# Patient Record
Sex: FEMALE | Race: WHITE | NOT HISPANIC OR LATINO | URBAN - METROPOLITAN AREA
[De-identification: names, ages, dates, MRNs, and addresses within clinical notes are randomized per-mention and may not be internally consistent; named-entity substitution may affect disease eponyms.]

---

## 2024-05-13 RX ORDER — SODIUM CHLORIDE 9 MG/ML
1000 INJECTION, SOLUTION INTRAVENOUS
Refills: 0 | Status: DISCONTINUED | OUTPATIENT
Start: 2024-05-15 | End: 2024-05-15

## 2024-05-14 NOTE — ASU PATIENT PROFILE, ADULT - NS PREOP UNDERSTANDS INFO
No solid food/dairy/candy/gum after midnight; water allowed before 05:00am; reminded to come with photo ID/insurance card; escort to have a photo ID; dress in comfortable clothes; no jewelries/contact lens; no smoking/drinking alcohol/illicit drug use today; address and callback number was given./yes

## 2024-05-14 NOTE — ASU PATIENT PROFILE, ADULT - FALL HARM RISK - HARM RISK INTERVENTIONS
Communicate Risk of Fall with Harm to all staff/Reinforce activity limits and safety measures with patient and family/Tailored Fall Risk Interventions/Visual Cue: Yellow wristband and red socks/Bed in lowest position, wheels locked, appropriate side rails in place/Call bell, personal items and telephone in reach/Instruct patient to call for assistance before getting out of bed or chair/Non-slip footwear when patient is out of bed/[place] to call system/Physically safe environment - no spills, clutter or unnecessary equipment/Purposeful Proactive Rounding/Room/bathroom lighting operational, light cord in reach Assistance with ambulation/Assistance OOB with selected safe patient handling equipment/Communicate Risk of Fall with Harm to all staff/Discuss with provider need for PT consult/Monitor gait and stability/Provide patient with walking aids - walker, cane, crutches/Reinforce activity limits and safety measures with patient and family/Tailored Fall Risk Interventions/Visual Cue: Yellow wristband and red socks/Bed in lowest position, wheels locked, appropriate side rails in place/Call bell, personal items and telephone in reach/Instruct patient to call for assistance before getting out of bed or chair/Non-slip footwear when patient is out of bed/[place] to call system/Physically safe environment - no spills, clutter or unnecessary equipment/Purposeful Proactive Rounding/Room/bathroom lighting operational, light cord in reach

## 2024-05-14 NOTE — ASU PATIENT PROFILE, ADULT - REASON FOR ADMISSION, PROFILE
Cataract extraction with intraocular lens implant with possible anterior vitrectomy Femtosecond laser Left eye

## 2024-05-14 NOTE — ASU PATIENT PROFILE, ADULT - NSICDXPASTSURGICALHX_GEN_ALL_CORE_FT
PAST SURGICAL HISTORY:  H/O fracture of tibia left    H/O rhinoplasty     H/O total hysterectomy     History of radiofrequency ablation (RFA) of nerve of lumbar spine

## 2024-05-14 NOTE — ASU PATIENT PROFILE, ADULT - NSICDXPASTMEDICALHX_GEN_ALL_CORE_FT
PAST MEDICAL HISTORY:  Hypothyroidism     Osteoporosis     Seasonal allergies      PAST MEDICAL HISTORY:  History of tremor left arm, worse when upset    Hypothyroidism     Osteoporosis     Seasonal allergies

## 2024-05-15 ENCOUNTER — OUTPATIENT (OUTPATIENT)
Dept: OUTPATIENT SERVICES | Facility: HOSPITAL | Age: 79
LOS: 1 days | Discharge: HOME | End: 2024-05-15

## 2024-05-15 VITALS
SYSTOLIC BLOOD PRESSURE: 150 MMHG | WEIGHT: 149.91 LBS | DIASTOLIC BLOOD PRESSURE: 63 MMHG | RESPIRATION RATE: 16 BRPM | OXYGEN SATURATION: 97 % | TEMPERATURE: 98 F | HEIGHT: 63 IN | HEART RATE: 71 BPM

## 2024-05-15 VITALS
TEMPERATURE: 98 F | OXYGEN SATURATION: 96 % | DIASTOLIC BLOOD PRESSURE: 57 MMHG | SYSTOLIC BLOOD PRESSURE: 125 MMHG | RESPIRATION RATE: 16 BRPM | HEART RATE: 76 BPM

## 2024-05-15 DIAGNOSIS — Z98.890 OTHER SPECIFIED POSTPROCEDURAL STATES: Chronic | ICD-10-CM

## 2024-05-15 DIAGNOSIS — Z90.710 ACQUIRED ABSENCE OF BOTH CERVIX AND UTERUS: Chronic | ICD-10-CM

## 2024-05-15 DIAGNOSIS — Z87.81 PERSONAL HISTORY OF (HEALED) TRAUMATIC FRACTURE: Chronic | ICD-10-CM

## 2024-05-15 DEVICE — IMPLANTABLE DEVICE
Type: IMPLANTABLE DEVICE | Status: NON-FUNCTIONAL
Removed: 2024-05-15

## 2024-05-15 RX ORDER — PHENYLEPHRINE HCL 2.5 %
1 DROPS OPHTHALMIC (EYE)
Refills: 0 | Status: COMPLETED | OUTPATIENT
Start: 2024-05-15 | End: 2024-05-15

## 2024-05-15 RX ORDER — KETOROLAC TROMETHAMINE 0.5 %
1 DROPS OPHTHALMIC (EYE)
Refills: 0 | Status: COMPLETED | OUTPATIENT
Start: 2024-05-15 | End: 2024-05-15

## 2024-05-15 RX ORDER — TROPICAMIDE 1 %
1 DROPS OPHTHALMIC (EYE)
Refills: 0 | Status: COMPLETED | OUTPATIENT
Start: 2024-05-15 | End: 2024-05-15

## 2024-05-15 RX ORDER — CYCLOPENTOLATE HYDROCHLORIDE 10 MG/ML
1 SOLUTION/ DROPS OPHTHALMIC
Refills: 0 | Status: COMPLETED | OUTPATIENT
Start: 2024-05-15 | End: 2024-05-15

## 2024-05-15 RX ORDER — OFLOXACIN 0.3 %
1 DROPS OPHTHALMIC (EYE)
Refills: 0 | Status: COMPLETED | OUTPATIENT
Start: 2024-05-15 | End: 2024-05-15

## 2024-05-15 RX ORDER — ACETAMINOPHEN 500 MG
650 TABLET ORAL ONCE
Refills: 0 | Status: DISCONTINUED | OUTPATIENT
Start: 2024-05-15 | End: 2024-05-15

## 2024-05-15 RX ADMIN — Medication 1 DROP(S): at 07:52

## 2024-05-15 RX ADMIN — Medication 1 DROP(S): at 07:57

## 2024-05-15 RX ADMIN — Medication 1 DROP(S): at 07:47

## 2024-05-15 RX ADMIN — Medication 1 DROP(S): at 07:48

## 2024-05-15 RX ADMIN — CYCLOPENTOLATE HYDROCHLORIDE 1 DROP(S): 10 SOLUTION/ DROPS OPHTHALMIC at 07:47

## 2024-05-15 RX ADMIN — CYCLOPENTOLATE HYDROCHLORIDE 1 DROP(S): 10 SOLUTION/ DROPS OPHTHALMIC at 07:57

## 2024-05-15 RX ADMIN — CYCLOPENTOLATE HYDROCHLORIDE 1 DROP(S): 10 SOLUTION/ DROPS OPHTHALMIC at 07:52

## 2024-05-15 NOTE — PACU DISCHARGE NOTE - NAUSEA/VOMITING:
Here today for NBUVB for psoriasis  Treatment # 16  No issues   photoproection on eyes, lips, nipples  1212 mj 2 minute 56 seconds today  Mineral oil applied  Goal 3 times weekly  
None

## 2024-05-15 NOTE — OPERATIVE REPORT - OPERATIVE RPOSRT DETAILS
PROCEDURE DATE:    SURGEON: KRISTAL WALTER MD     ANESTHESIA:  MAC.    PREOPERATIVE DIAGNOSIS(ES):  Cataract, left eye    POSTOPERATIVE DIAGNOSIS(ES):  Cataract, left eye    OPERATION:  Femtosecond laser assisted cataract extraction with intraocular lens insertion, left eye.    COMPLICATIONS:  None.    SPECIMENS:  None.    ESTIMATED BLOOD LOSS: <1 cc    DESCRIPTION OF PROCEDURE:  The patient was identified in the ASU.  The risks, benefits, and alternatives to surgery were discussed with the patient at length including but not limtied to bleeding, infection, inflammation, decreased or loss of vision, loss of the eye, need for further surgery, aphakia, vitrectomy.  Informed consent was obtained.  The left eye was identified and marked.      The patient was then bought to the laser room and time out was taken to confirm the patients identity, site, procedure.  Tetracaine eye drops were instilled.  The left eye was marked at the 3, 6, 9 o' clock positions.  The patient was placed in the supine position on the femto table and the left eye was docked onto the laser.  The femtosecond laser was then used to construct arcuate incisions, a curvilinear continuous capsulrohexis and nucleas fragmentation.  The patient's eye was then undocked from the laser.     The patient was then brought to the operating room and placed in the supine position.  Time out was retaken.  Tetracaine eye drops were reinstilled.  The left eye was prepped and draped in the usual sterile fashion for intraocular surgery.  An eyelid speculum was then placed underneath the right eyelids.    A 1.0mm sideport blade was used to create a paracentesis.  Preservativefree 1% lidocaine was injected into the anterior chamber, followed by preservative free epinephrine for pupil dilation, followed by Viscoat.  A 2.4 keratome was used to create a temporal clear corneal incision.  Utrata forceps were used to remove the capsular button.  Hydrodissection was done with BSS, and the lens was noted to rotate freely in the capsular bag.    Phacoemulsification was accomplished  without complications.  Residual cortical material was removed using singlehandpiece coaxial polymer tipped irrigation and aspiration.   Healon was then injected into the capsular bag.  The CC60WF 24.0 diopter lens was implanted into the capsular bag and rotated into proper position using a Kuglen hook.  Irrigation and aspiration was used to remove any residual cortical material and viscoelastic.  All wounds were hydrated and found to be watertight.  The lens was centered, and the anterior chamber was deep.  IOP was within physiological limits.  No complications were noted.    Topical antibiotics and steroids and NSAID were applied to the surface of the left eye.  The eyelid speculum was removed.  The was shielded.  The patient tolerated the procedure well and was brought to the postoperative care unit in stable condition.

## 2024-05-15 NOTE — PRE-ANESTHESIA EVALUATION ADULT - NSANTHOSAYNRD_GEN_A_CORE
No. TYRELL screening performed.  STOP BANG Legend: 0-2 = LOW Risk; 3-4 = INTERMEDIATE Risk; 5-8 = HIGH Risk

## 2024-06-12 PROBLEM — M81.0 AGE-RELATED OSTEOPOROSIS WITHOUT CURRENT PATHOLOGICAL FRACTURE: Chronic | Status: ACTIVE | Noted: 2024-05-14

## 2024-06-12 PROBLEM — Z86.69 PERSONAL HISTORY OF OTHER DISEASES OF THE NERVOUS SYSTEM AND SENSE ORGANS: Chronic | Status: ACTIVE | Noted: 2024-05-15

## 2024-06-12 PROBLEM — J30.2 OTHER SEASONAL ALLERGIC RHINITIS: Chronic | Status: ACTIVE | Noted: 2024-05-14

## 2024-06-12 PROBLEM — E03.9 HYPOTHYROIDISM, UNSPECIFIED: Chronic | Status: ACTIVE | Noted: 2024-05-14

## 2024-06-18 RX ORDER — SODIUM CHLORIDE 9 MG/ML
1000 INJECTION, SOLUTION INTRAVENOUS
Refills: 0 | Status: DISCONTINUED | OUTPATIENT
Start: 2024-06-19 | End: 2024-06-19

## 2024-06-18 NOTE — ASU PATIENT PROFILE, ADULT - NSICDXPASTMEDICALHX_GEN_ALL_CORE_FT
PAST MEDICAL HISTORY:  History of tremor left arm, worse when upset    Hypothyroidism     Osteoporosis     Seasonal allergies      PAST MEDICAL HISTORY:  Arthritis knees    Easy bruising     H/O sinusitis     History of tremor left arm, worse when upset    Hypothyroidism     Osteoporosis     Seasonal allergies

## 2024-06-18 NOTE — ASU PATIENT PROFILE, ADULT - NS PREOP UNDERSTANDS INFO
Spoke to patient to be NPO/No solid foods after  2200 pm tonight, allow to drink water or apple juice till  12-2 am,  dress comfortable  no lotion s, no jewelry  , bring ID photo  and insurance cards , escort arranged , address and telephone given y/yes

## 2024-06-18 NOTE — ASU PATIENT PROFILE, ADULT - VISION (WITH CORRECTIVE LENSES IF THE PATIENT USUALLY WEARS THEM):
Normal vision: sees adequately in most situations; can see medication labels, newsprint PRN/Partially impaired: cannot see medication labels or newsprint, but can see obstacles in path, and the surrounding layout; can count fingers at arm's length

## 2024-06-19 ENCOUNTER — OUTPATIENT (OUTPATIENT)
Dept: OUTPATIENT SERVICES | Facility: HOSPITAL | Age: 79
LOS: 1 days | Discharge: HOME | End: 2024-06-19

## 2024-06-19 VITALS
HEART RATE: 70 BPM | OXYGEN SATURATION: 97 % | RESPIRATION RATE: 16 BRPM | WEIGHT: 154.76 LBS | TEMPERATURE: 98 F | SYSTOLIC BLOOD PRESSURE: 172 MMHG | HEIGHT: 63 IN | DIASTOLIC BLOOD PRESSURE: 81 MMHG

## 2024-06-19 VITALS
HEART RATE: 80 BPM | SYSTOLIC BLOOD PRESSURE: 111 MMHG | TEMPERATURE: 99 F | OXYGEN SATURATION: 97 % | RESPIRATION RATE: 16 BRPM | DIASTOLIC BLOOD PRESSURE: 66 MMHG

## 2024-06-19 DIAGNOSIS — Z87.81 PERSONAL HISTORY OF (HEALED) TRAUMATIC FRACTURE: Chronic | ICD-10-CM

## 2024-06-19 DIAGNOSIS — Z98.890 OTHER SPECIFIED POSTPROCEDURAL STATES: Chronic | ICD-10-CM

## 2024-06-19 DIAGNOSIS — Z90.710 ACQUIRED ABSENCE OF BOTH CERVIX AND UTERUS: Chronic | ICD-10-CM

## 2024-06-19 DEVICE — IMPLANTABLE DEVICE
Type: IMPLANTABLE DEVICE | Site: RIGHT | Status: NON-FUNCTIONAL
Removed: 2024-06-19

## 2024-06-19 RX ORDER — TROPICAMIDE 1 %
1 DROPS OPHTHALMIC (EYE)
Refills: 0 | Status: COMPLETED | OUTPATIENT
Start: 2024-06-19 | End: 2024-06-19

## 2024-06-19 RX ORDER — ALENDRONATE SODIUM 70 MG/1
1 TABLET ORAL
Refills: 0 | DISCHARGE

## 2024-06-19 RX ORDER — LORATADINE 10 MG/1
1 TABLET ORAL
Refills: 0 | DISCHARGE

## 2024-06-19 RX ORDER — PHENYLEPHRINE HCL 2.5 %
1 DROPS OPHTHALMIC (EYE)
Refills: 0 | Status: COMPLETED | OUTPATIENT
Start: 2024-06-19 | End: 2024-06-19

## 2024-06-19 RX ORDER — CYCLOPENTOLATE HYDROCHLORIDE 10 MG/ML
1 SOLUTION/ DROPS OPHTHALMIC
Refills: 0 | Status: COMPLETED | OUTPATIENT
Start: 2024-06-19 | End: 2024-06-19

## 2024-06-19 RX ORDER — KETOROLAC TROMETHAMINE 0.5 %
1 DROPS OPHTHALMIC (EYE)
Refills: 0 | Status: COMPLETED | OUTPATIENT
Start: 2024-06-19 | End: 2024-06-19

## 2024-06-19 RX ORDER — ACETAMINOPHEN 500 MG
2 TABLET ORAL
Refills: 0 | DISCHARGE

## 2024-06-19 RX ORDER — ACETAMINOPHEN 500 MG
1000 TABLET ORAL ONCE
Refills: 0 | Status: DISCONTINUED | OUTPATIENT
Start: 2024-06-19 | End: 2024-06-19

## 2024-06-19 RX ORDER — OFLOXACIN 0.3 %
1 DROPS OPHTHALMIC (EYE)
Refills: 0 | Status: COMPLETED | OUTPATIENT
Start: 2024-06-19 | End: 2024-06-19

## 2024-06-19 RX ORDER — THYROID 120 MG
1 TABLET ORAL
Refills: 0 | DISCHARGE

## 2024-06-19 RX ADMIN — Medication 1 DROP(S): at 09:00

## 2024-06-19 RX ADMIN — Medication 1 DROP(S): at 08:50

## 2024-06-19 RX ADMIN — Medication 1 DROP(S): at 08:55

## 2024-06-19 RX ADMIN — CYCLOPENTOLATE HYDROCHLORIDE 1 DROP(S): 10 SOLUTION/ DROPS OPHTHALMIC at 08:50

## 2024-06-19 RX ADMIN — SODIUM CHLORIDE 40 MILLILITER(S): 9 INJECTION, SOLUTION INTRAVENOUS at 12:09

## 2024-06-19 RX ADMIN — CYCLOPENTOLATE HYDROCHLORIDE 1 DROP(S): 10 SOLUTION/ DROPS OPHTHALMIC at 08:55

## 2024-06-19 RX ADMIN — CYCLOPENTOLATE HYDROCHLORIDE 1 DROP(S): 10 SOLUTION/ DROPS OPHTHALMIC at 09:00

## 2024-06-19 NOTE — OPERATIVE REPORT - OPERATIVE RPOSRT DETAILS
PROCEDURE DATE:    SURGEON: KRISTAL WALTER MD     ANESTHESIA:  MAC.    PREOPERATIVE DIAGNOSIS(ES):  Cataract, Right eye.    POSTOPERATIVE DIAGNOSIS(ES):  Cataract,right eye.    OPERATION:  Femtosecond laser assisted cataract extraction with intraocular lens insertion, right eye.    COMPLICATIONS:  None.    SPECIMENS:  None.    ESTIMATED BLOOD LOSS: <1 cc    DESCRIPTION OF PROCEDURE:  The patient was identified in the ASU.  The risks, benefits, and alternatives to surgery were discussed with the patient at length including but not limtied to bleeding, infection, inflammation, decreased or loss of vision, loss of the eye, need for further surgery, aphakia, vitrectomy.  Informed consent was obtained.  The right eye was identified and marked.      The patient was then bought to the laser room and time out was taken to confirm the patients identity, site, procedure.  Tetracaine eye drops were instilled.  The right eye was marked at the 3, 6, 9 o' clock positions.  The patient was placed in the supine position on the femto table and te right eye was docked onto the laser.  The femtosecond laser was then used to construct arcuate incisions, a curvilinear continuous capsulrohexis and nucleas fragmentation.  The patient's eye was then undocked from the laser.     The patient was then brought to the operating room and placed in the supine position.  Time out was retaken.  Tetracaine eye drops were reinstilled.  The right eye was prepped and draped in the usual sterile fashion for intraocular surgery.  An eyelid speculum was then placed underneath the right eyelids.    A 1.0mm sideport blade was used to create a paracentesis.  Preservativefree 1% lidocaine was injected into the anterior chamber, followed by preservative free epinephrine for pupil dilation, followed by Viscoat.  A 2.4 keratome was used to create a temporal clear corneal incision.  Utrata forceps were used to remove the capsular button.  Hydrodissection was done with BSS, and the lens was noted to rotate freely in the capsular bag.    Phacoemulsification was accomplished  without complications.  Residual cortical material was removed using singlehandpiece coaxial polymer tipped irrigation and aspiration.   Healon was then injected into the capsular bag.  The CC60WF +24.0 diopter lens was implanted into the capsular bag and rotated into proper position using a Kuglen hook.  Irrigation and aspiration was used to remove any residual cortical material and viscoelastic.  All wounds were hydrated and found to be watertight.  The lens was centered, and the anterior chamber was deep.  IOP was within physiological limits.  No complications were noted.    Topical antibiotics and steroids and NSAID were applied to the surface of the right eye.  The eyelid speculum was removed.  The was shielded.  The patient tolerated the procedure well and was brought to the postoperative care unit in stable condition.

## 2024-11-05 ENCOUNTER — EMERGENCY (EMERGENCY)
Facility: HOSPITAL | Age: 79
LOS: 1 days | Discharge: HOME | End: 2024-11-05
Attending: EMERGENCY MEDICINE | Admitting: EMERGENCY MEDICINE
Payer: MEDICARE

## 2024-11-05 VITALS
TEMPERATURE: 98 F | WEIGHT: 149.91 LBS | RESPIRATION RATE: 18 BRPM | HEART RATE: 83 BPM | HEIGHT: 63 IN | DIASTOLIC BLOOD PRESSURE: 79 MMHG | SYSTOLIC BLOOD PRESSURE: 143 MMHG | OXYGEN SATURATION: 98 %

## 2024-11-05 VITALS
RESPIRATION RATE: 18 BRPM | TEMPERATURE: 98 F | SYSTOLIC BLOOD PRESSURE: 134 MMHG | HEART RATE: 60 BPM | OXYGEN SATURATION: 95 % | DIASTOLIC BLOOD PRESSURE: 78 MMHG

## 2024-11-05 DIAGNOSIS — Z87.81 PERSONAL HISTORY OF (HEALED) TRAUMATIC FRACTURE: Chronic | ICD-10-CM

## 2024-11-05 DIAGNOSIS — Z98.890 OTHER SPECIFIED POSTPROCEDURAL STATES: Chronic | ICD-10-CM

## 2024-11-05 DIAGNOSIS — Z90.710 ACQUIRED ABSENCE OF BOTH CERVIX AND UTERUS: Chronic | ICD-10-CM

## 2024-11-05 PROBLEM — Z87.09 PERSONAL HISTORY OF OTHER DISEASES OF THE RESPIRATORY SYSTEM: Chronic | Status: ACTIVE | Noted: 2024-06-19

## 2024-11-05 PROBLEM — R23.3 SPONTANEOUS ECCHYMOSES: Chronic | Status: ACTIVE | Noted: 2024-06-19

## 2024-11-05 PROBLEM — M19.90 UNSPECIFIED OSTEOARTHRITIS, UNSPECIFIED SITE: Chronic | Status: ACTIVE | Noted: 2024-06-19

## 2024-11-05 LAB
ANION GAP SERPL CALC-SCNC: 9 MMOL/L — SIGNIFICANT CHANGE UP (ref 5–17)
BUN SERPL-MCNC: 19 MG/DL — SIGNIFICANT CHANGE UP (ref 7–23)
CALCIUM SERPL-MCNC: 9.8 MG/DL — SIGNIFICANT CHANGE UP (ref 8.4–10.5)
CHLORIDE SERPL-SCNC: 96 MMOL/L — SIGNIFICANT CHANGE UP (ref 96–108)
CO2 SERPL-SCNC: 28 MMOL/L — SIGNIFICANT CHANGE UP (ref 22–31)
CREAT SERPL-MCNC: 0.62 MG/DL — SIGNIFICANT CHANGE UP (ref 0.5–1.3)
D DIMER BLD IA.RAPID-MCNC: 364 NG/ML DDU — HIGH
EGFR: 91 ML/MIN/1.73M2 — SIGNIFICANT CHANGE UP
GLUCOSE SERPL-MCNC: 110 MG/DL — HIGH (ref 70–99)
HCT VFR BLD CALC: 42.5 % — SIGNIFICANT CHANGE UP (ref 34.5–45)
HGB BLD-MCNC: 14.1 G/DL — SIGNIFICANT CHANGE UP (ref 11.5–15.5)
MAGNESIUM SERPL-MCNC: 1.8 MG/DL — SIGNIFICANT CHANGE UP (ref 1.6–2.6)
MCHC RBC-ENTMCNC: 31.9 PG — SIGNIFICANT CHANGE UP (ref 27–34)
MCHC RBC-ENTMCNC: 33.2 G/DL — SIGNIFICANT CHANGE UP (ref 32–36)
MCV RBC AUTO: 96.2 FL — SIGNIFICANT CHANGE UP (ref 80–100)
NRBC # BLD: 0 /100 WBCS — SIGNIFICANT CHANGE UP (ref 0–0)
NT-PROBNP SERPL-SCNC: 136 PG/ML — SIGNIFICANT CHANGE UP (ref 0–300)
PLATELET # BLD AUTO: 282 K/UL — SIGNIFICANT CHANGE UP (ref 150–400)
POTASSIUM SERPL-MCNC: 4.1 MMOL/L — SIGNIFICANT CHANGE UP (ref 3.5–5.3)
POTASSIUM SERPL-SCNC: 4.1 MMOL/L — SIGNIFICANT CHANGE UP (ref 3.5–5.3)
RBC # BLD: 4.42 M/UL — SIGNIFICANT CHANGE UP (ref 3.8–5.2)
RBC # FLD: 12.4 % — SIGNIFICANT CHANGE UP (ref 10.3–14.5)
SODIUM SERPL-SCNC: 133 MMOL/L — LOW (ref 135–145)
TROPONIN T, HIGH SENSITIVITY RESULT: 12 NG/L — SIGNIFICANT CHANGE UP (ref 0–51)
TROPONIN T, HIGH SENSITIVITY RESULT: 14 NG/L — SIGNIFICANT CHANGE UP (ref 0–51)
TSH SERPL-MCNC: 5.47 UIU/ML — HIGH (ref 0.27–4.2)
WBC # BLD: 6.26 K/UL — SIGNIFICANT CHANGE UP (ref 3.8–10.5)
WBC # FLD AUTO: 6.26 K/UL — SIGNIFICANT CHANGE UP (ref 3.8–10.5)

## 2024-11-05 PROCEDURE — 71045 X-RAY EXAM CHEST 1 VIEW: CPT | Mod: 26

## 2024-11-05 PROCEDURE — 99285 EMERGENCY DEPT VISIT HI MDM: CPT

## 2024-11-05 PROCEDURE — 75574 CT ANGIO HRT W/3D IMAGE: CPT | Mod: 26,MC

## 2024-11-05 RX ORDER — KETOROLAC TROMETHAMINE 30 MG/ML
15 INJECTION INTRAMUSCULAR; INTRAVENOUS ONCE
Refills: 0 | Status: DISCONTINUED | OUTPATIENT
Start: 2024-11-05 | End: 2024-11-05

## 2024-11-05 RX ORDER — DICLOFENAC SODIUM 75 MG
1 TABLET, DELAYED RELEASE (ENTERIC COATED) ORAL
Qty: 30 | Refills: 0
Start: 2024-11-05

## 2024-11-05 RX ORDER — SODIUM CHLORIDE 9 MG/ML
1000 INJECTION, SOLUTION INTRAMUSCULAR; INTRAVENOUS; SUBCUTANEOUS ONCE
Refills: 0 | Status: COMPLETED | OUTPATIENT
Start: 2024-11-05 | End: 2024-11-05

## 2024-11-05 RX ORDER — METHYLPREDNISOLONE ACETATE 80 MG/ML
1 INJECTION, SUSPENSION INTRALESIONAL; INTRAMUSCULAR; INTRASYNOVIAL; SOFT TISSUE
Qty: 1 | Refills: 0
Start: 2024-11-05

## 2024-11-05 RX ORDER — METOPROLOL TARTRATE 50 MG
50 TABLET ORAL ONCE
Refills: 0 | Status: COMPLETED | OUTPATIENT
Start: 2024-11-05 | End: 2024-11-05

## 2024-11-05 RX ORDER — CYCLOBENZAPRINE HYDROCHLORIDE 30 MG/1
1 CAPSULE, EXTENDED RELEASE ORAL
Qty: 20 | Refills: 0
Start: 2024-11-05

## 2024-11-05 RX ADMIN — SODIUM CHLORIDE 1000 MILLILITER(S): 9 INJECTION, SOLUTION INTRAMUSCULAR; INTRAVENOUS; SUBCUTANEOUS at 15:28

## 2024-11-05 RX ADMIN — Medication 40 MILLIGRAM(S): at 15:04

## 2024-11-05 RX ADMIN — KETOROLAC TROMETHAMINE 15 MILLIGRAM(S): 30 INJECTION INTRAMUSCULAR; INTRAVENOUS at 15:04

## 2024-11-05 RX ADMIN — Medication 50 MILLIGRAM(S): at 15:04

## 2024-11-05 NOTE — ED PROVIDER NOTE - OBJECTIVE STATEMENT
79-year-old female history of Parkinson's, osteoporosis, sciatica, hypothyroid (recent decrease in [place] Thyroid dose) complaining of 1.  Right lower extremity pain   For approximately 2 weeks after the patient tried to get into a cab that was too high.  Patient reports this irritated her sciatica, which she has not had  since age 25.  Patient notes pain in her right leg going from her hip area down the back of her leg all the way to her foot at times but usually to the back of her knee.  Pain is worse whenever she tries to get up to walk or weight-bear.  Patient is using a walker at baseline.  No associated numbness, weakness, incontinence.  Patient feels relief with Tylenol and Aleve, last dose this morning.  Patient was on her way to see her  doctor about a possible epidural injection but then started having palpitations. Pt s/p rfa of lumbar spinal nerve.   2.  Chest pain/palpitations-patient reports an episode of chest pain substernal pressure-like pain lasting 5 to 10 minutes 3 days ago that self resolved.  Patient felt it after she had been walking either to her from the bathroom and also being flustered by her partner who makes her nervous  at times.  Patient today began to feel palpitation sensation when she was on the way to the doctor so decided to come to the ER instead of going to the doctor's appointment.  Patient reports she has been having some palpitation sensation whenever she has been trying to walk and also feeling some shortness of breath.  Patient states she has been leaning heavily on her walker to offset pressure in her leg when she is walking.  No history of CAD, hypertension, high cholesterol, diabetes.  No current chest pain.  patient thinks that she was told she had arrhythmia but is not sure of the name of the type.

## 2024-11-05 NOTE — ED ADULT TRIAGE NOTE - CHIEF COMPLAINT QUOTE
Pt c/o chest palpitations with exertion since Saturday. Pt ambulatory with rollator. Pt breathing even and unlabored with equal chest rise and fall. Pt tremulous in RUE at baseline.   Pt hx of parkinson's and osteoporosis.

## 2024-11-05 NOTE — ED PROVIDER NOTE - CARE PLAN
1 Principal Discharge DX:	Sciatica  Secondary Diagnosis:	Chest pain  Secondary Diagnosis:	Palpitations

## 2024-11-05 NOTE — ED PROVIDER NOTE - PROGRESS NOTE DETAILS
Labs w neg trop, cta w nonobstructive disease, no pe.  Pt feeling better after pain meds, ambulating in ed.  Will dc to [facility], card, her back md's.

## 2024-11-05 NOTE — ED PROVIDER NOTE - PHYSICAL EXAMINATION
VITAL SIGNS: I have reviewed nursing notes and confirm.  CONSTITUTIONAL:  in no acute distress.   SKIN:  warm and dry, no acute rash.   HEAD:  normocephalic, atraumatic.  EYES: PERRL, EOM intact; conjunctiva and sclera clear.  ENT: No nasal discharge; airway clear.   NECK: Supple; non tender.  CARD: S1, S2 normal; no murmurs, gallops, or rubs. Regular rate and rhythm.   RESP:  Clear to auscultation b/l, no wheezes, rales or rhonchi.  ABD: Normal bowel sounds; soft; non-distended; non-tender; no guarding/ rebound.  MSK: Normal ROM. No clubbing, cyanosis or edema. no ttp bilat le  NEURO: Alert, oriented, grossly unremarkable, motor 5/5, no sens deficits.   PSYCH: Cooperative, mood and affect appropriate.

## 2024-11-05 NOTE — ED ADULT NURSE NOTE - CAS EDN INTEG ASSESS
Aspiration pneumonia of upper lobe, unspecified aspiration pneumonia type, unspecified laterality - - -

## 2024-11-05 NOTE — ED ADULT NURSE NOTE - NSICDXPASTMEDICALHX_GEN_ALL_CORE_FT
PAST MEDICAL HISTORY:  Arthritis knees    Easy bruising     H/O sinusitis     History of tremor left arm, worse when upset    Hypothyroidism     Osteoporosis     Seasonal allergies

## 2024-11-05 NOTE — ED PROVIDER NOTE - NSFOLLOWUPINSTRUCTIONS_ED_ALL_ED_FT
Sciatica  Palpitations  Chest pain    Take diclofenac 1 tab every 8 hours with food as needed for pain.  Add tylenol for additional pain relief as needed - use as directed.   Take cyclobenzaprine 1 tab every 8 hours as needed for pain/spasm.  Take medrol dose pack as directed.     Try ice or heat, massage, consider acupuncture for additional relief.  Try a back pain patch like icy-hot or similar.  Return for increased pain, numbness/weakness in leg, incontinence, any other concerns.    Follow up with your pcp and spine specialist.     Also follow up with cardiology; Return for increased pain, difficulty breathing, palpitations, any other concerns.     Back Pain    Back pain is very common in adults. The cause of back pain is rarely dangerous and the pain often gets better over time. The cause of your back pain may not be known and may include strain of muscles or ligaments, degeneration of the spinal disks, or arthritis. Occasionally the pain may radiate down your leg(s). Over-the-counter medicines to reduce pain and inflammation are often the most helpful. Stretching and remaining active frequently helps the healing process.     SEEK IMMEDIATE MEDICAL CARE IF YOU HAVE ANY OF THE FOLLOWING SYMPTOMS: bowel or bladder control problems, unusual weakness or numbness in your arms or legs, nausea or vomiting, abdominal pain, fever, dizziness/lightheadedness.  --  Palpitations    A palpitation is the feeling that your heartbeat is irregular or is faster than normal. It may feel like your heart is fluttering or skipping a beat. They may be caused by many things, including smoking, caffeine, alcohol, stress, and certain medicines. Although most causes of palpitations are not serious, palpitations can be a sign of a serious medical problem. Avoid caffeine, alcohol, and tobacco products at home. Try to reduce stress and anxiety and make sure to get plenty of rest.     SEEK IMMEDIATE MEDICAL CARE IF YOU HAVE ANY OF THE FOLLOWING SYMPTOMS: chest pain, shortness of breath, severe headache, dizziness/lightheadedness, or fainting.  --  Chest Pain    Chest pain can be caused by many different conditions which may or may not be dangerous. Causes include heartburn, lung infections, heart attack, blood clot in lungs, skin infections, strain or damage to muscle, cartilage, or bones, etc. In addition to a history and physical examination, an electrocardiogram (ECG) or other lab tests may have been performed to determine the cause of your chest pain. Follow up with your primary care provider or with a cardiologist as instructed.     SEEK IMMEDIATE MEDICAL CARE IF YOU HAVE ANY OF THE FOLLOWING SYMPTOMS: worsening chest pain, coughing up blood, unexplained back/neck/jaw pain, severe abdominal pain, dizziness or lightheadedness, fainting, shortness of breath, sweaty or clammy skin, vomiting, or racing heart beat. These symptoms may represent a serious problem that is an emergency. Do not wait to see if the symptoms will go away. Get medical help right away. Call 911 and do not drive yourself to the hospital.

## 2024-11-05 NOTE — ED ADULT NURSE NOTE - OBJECTIVE STATEMENT
Pt is 79 y.o female pt came in c/o palpitations with exertion since Saturday. Pt ambulatory with walker. Pt breathing even and unlabored with equal chest rise and fall. Pt tremulous in RUE at baseline. Pt hx of parkinson's and osteoporosis. EKG done in triage. Denies CP or SOB. Pt A&Ox4, NAD, IV inserted and labs drawn. Pt hooked to cardiac monitor and spo2. Assessment ongoing. Pt is 79 y.o female pt came in c/o palpitations with exertion since Saturday. Pt ambulatory with walker. Pt breathing even and unlabored with equal chest rise and fall. Pt tremulous in RUE at baseline. Pt hx of parkinson's and osteoporosis. EKG done in triage. Denies CP or SOB. Pt A&Ox4, NAD, IV inserted and labs drawn. Pt hooked to cardiac monitor and spo2. Pt also c/o sciatica pain on the R hip. Assessment ongoing.

## 2024-11-05 NOTE — ED ADULT NURSE NOTE - NSFALLHARMRISKINTERV_ED_ALL_ED
Assistance OOB with selected safe patient handling equipment if applicable/Communicate risk of Fall with Harm to all staff, patient, and family/Monitor gait and stability/Provide patient with walking aids/Provide visual cue: red socks, yellow wristband, yellow gown, etc/Reinforce activity limits and safety measures with patient and family/Bed in lowest position, wheels locked, appropriate side rails in place/Call bell, personal items and telephone in reach/Instruct patient to call for assistance before getting out of bed/chair/stretcher/Non-slip footwear applied when patient is off stretcher/[place] to call system/Physically safe environment - no spills, clutter or unnecessary equipment/Purposeful Proactive Rounding/Room/bathroom lighting operational, light cord in reach

## 2024-11-05 NOTE — ED PROVIDER NOTE - PATIENT PORTAL LINK FT
You can access the FollowMyHealth Patient Portal offered by [facility] by registering at the following website: [email or web address]. By joining [employer]’s FollowMyHealth portal, you will also be able to view your health information using other applications (apps) compatible with our system.

## 2024-11-05 NOTE — ED PROVIDER NOTE - NSICDXPASTMEDICALHX_GEN_ALL_CORE_FT
PAST MEDICAL HISTORY:  Arthritis knees    Easy bruising     H/O Parkinson's disease     H/O sciatica     H/O sinusitis     History of tremor left arm, worse when upset    Hypothyroidism     Osteoporosis     Seasonal allergies

## 2024-11-05 NOTE — ED PROVIDER NOTE - CLINICAL SUMMARY MEDICAL DECISION MAKING FREE TEXT BOX
Pt c/o 1. RLE pain - suspect sciatica based on hpi and h/o same; no neuro deficits to warrant emergent mri.  Will try pain meds.  2. cp, palpitations - ekg w/o ischemic changes, + nsr.  ? acs, arrhythmia, related to pt's thyroid med being too high, anxiety.  Plan cta cardiac.  Initial trop 14, will trend.  Reassess. See progress notes for further mdm related documentation.

## 2024-11-08 DIAGNOSIS — M79.604 PAIN IN RIGHT LEG: ICD-10-CM

## 2024-11-08 DIAGNOSIS — Z87.39 PERSONAL HISTORY OF OTHER DISEASES OF THE MUSCULOSKELETAL SYSTEM AND CONNECTIVE TISSUE: ICD-10-CM

## 2024-11-08 DIAGNOSIS — Z88.1 ALLERGY STATUS TO OTHER ANTIBIOTIC AGENTS: ICD-10-CM

## 2024-11-08 DIAGNOSIS — M81.0 AGE-RELATED OSTEOPOROSIS WITHOUT CURRENT PATHOLOGICAL FRACTURE: ICD-10-CM

## 2024-11-08 DIAGNOSIS — G20.A1 PARKINSON'S DISEASE WITHOUT DYSKINESIA, WITHOUT MENTION OF FLUCTUATIONS: ICD-10-CM

## 2024-11-08 DIAGNOSIS — E03.9 HYPOTHYROIDISM, UNSPECIFIED: ICD-10-CM

## 2024-11-08 DIAGNOSIS — R00.2 PALPITATIONS: ICD-10-CM

## 2024-11-08 DIAGNOSIS — R07.2 PRECORDIAL PAIN: ICD-10-CM

## 2024-11-08 DIAGNOSIS — M54.31 SCIATICA, RIGHT SIDE: ICD-10-CM

## 2024-11-08 DIAGNOSIS — Z88.0 ALLERGY STATUS TO PENICILLIN: ICD-10-CM

## 2024-11-08 DIAGNOSIS — R06.02 SHORTNESS OF BREATH: ICD-10-CM

## 2024-11-20 PROCEDURE — 36415 COLL VENOUS BLD VENIPUNCTURE: CPT

## 2024-11-20 PROCEDURE — 80048 BASIC METABOLIC PNL TOTAL CA: CPT

## 2024-11-20 PROCEDURE — 99284 EMERGENCY DEPT VISIT MOD MDM: CPT | Mod: 25

## 2024-11-20 PROCEDURE — 84484 ASSAY OF TROPONIN QUANT: CPT

## 2024-11-20 PROCEDURE — 85027 COMPLETE CBC AUTOMATED: CPT

## 2024-11-20 PROCEDURE — 83880 ASSAY OF NATRIURETIC PEPTIDE: CPT

## 2024-11-20 PROCEDURE — 84443 ASSAY THYROID STIM HORMONE: CPT

## 2024-11-20 PROCEDURE — 85379 FIBRIN DEGRADATION QUANT: CPT

## 2024-11-20 PROCEDURE — 83735 ASSAY OF MAGNESIUM: CPT

## 2024-11-20 PROCEDURE — 96374 THER/PROPH/DIAG INJ IV PUSH: CPT | Mod: XU

## 2024-11-20 PROCEDURE — 71045 X-RAY EXAM CHEST 1 VIEW: CPT

## 2024-11-20 PROCEDURE — 75574 CT ANGIO HRT W/3D IMAGE: CPT | Mod: MC

## (undated) DEVICE — NUCLEUS HYDRODISSECTOR PEARCE ANGLED 25G X 22MM

## (undated) DEVICE — SLEEVE INTREPID MICROSMOOTH ULTRA INFUSION 0.9MM (PINK)

## (undated) DEVICE — CANNULA IRR ANT CHAMBER 30G

## (undated) DEVICE — PACK ANTERIOR SEGMENT

## (undated) DEVICE — PACK CENTURION 2.75MM

## (undated) DEVICE — TRANSFORMER INTREPID I/A 0.3MM

## (undated) DEVICE — MILOOP LENS MILOOP FRAGMENTATION DEVICE

## (undated) DEVICE — GLV 7.5 PROTEXIS (WHITE)

## (undated) DEVICE — SOL IRR BAL SALT 500ML

## (undated) DEVICE — KNIFE ALCON PARACENTESIS CLEARCUT SIDEPORT 1MM (YELLOW)

## (undated) DEVICE — DRAPE MICROSCOPE KNOB COVER SMALL (2 PCS)

## (undated) DEVICE — Device

## (undated) DEVICE — KNIFE ALCON SLIT INTREPID CLEAR-CUT SAFETY 2.4MM

## (undated) DEVICE — SUT NYLON 10-0 12" CU-5